# Patient Record
Sex: MALE | Race: WHITE | NOT HISPANIC OR LATINO | Employment: PART TIME | URBAN - METROPOLITAN AREA
[De-identification: names, ages, dates, MRNs, and addresses within clinical notes are randomized per-mention and may not be internally consistent; named-entity substitution may affect disease eponyms.]

---

## 2019-11-09 ENCOUNTER — HOSPITAL ENCOUNTER (EMERGENCY)
Facility: HOSPITAL | Age: 54
Discharge: HOME | End: 2019-11-09
Attending: EMERGENCY MEDICINE

## 2019-11-09 VITALS
OXYGEN SATURATION: 96 % | WEIGHT: 220 LBS | HEIGHT: 66 IN | BODY MASS INDEX: 35.36 KG/M2 | RESPIRATION RATE: 16 BRPM | TEMPERATURE: 98.1 F | DIASTOLIC BLOOD PRESSURE: 99 MMHG | SYSTOLIC BLOOD PRESSURE: 147 MMHG

## 2019-11-09 DIAGNOSIS — Z76.0 MEDICATION REFILL: Primary | ICD-10-CM

## 2019-11-09 PROCEDURE — 99281 EMR DPT VST MAYX REQ PHY/QHP: CPT

## 2019-11-09 RX ORDER — LISINOPRIL 10 MG/1
10 TABLET ORAL DAILY
COMMUNITY

## 2019-11-09 RX ORDER — GABAPENTIN 300 MG/1
300 CAPSULE ORAL
Refills: 1 | COMMUNITY
Start: 2019-10-26

## 2019-11-09 RX ORDER — LISINOPRIL 10 MG/1
10 TABLET ORAL DAILY
Qty: 30 TABLET | Refills: 0 | Status: SHIPPED | OUTPATIENT
Start: 2019-11-09 | End: 2019-12-09

## 2019-11-09 RX ORDER — AMLODIPINE BESYLATE 10 MG/1
10 TABLET ORAL DAILY
COMMUNITY

## 2019-11-09 RX ORDER — AMLODIPINE BESYLATE 10 MG/1
10 TABLET ORAL DAILY
Qty: 30 TABLET | Refills: 0 | Status: SHIPPED | OUTPATIENT
Start: 2019-11-09 | End: 2019-12-09

## 2019-11-09 ASSESSMENT — ENCOUNTER SYMPTOMS
SHORTNESS OF BREATH: 0
LIGHT-HEADEDNESS: 0
HEADACHES: 0
CHEST TIGHTNESS: 0
DIZZINESS: 0

## 2019-11-09 NOTE — ED PROVIDER NOTES
"HPI     Chief Complaint   Patient presents with   • Med Refill         History provided by:  Patient  Med Refill   Medications/supplies requested:  Amlodipine, lisinopril  Reason for request:  Medications ran out and clinic/provider not available  Medications taken before: yes - see home medications         Patient History     Past Medical History:   Diagnosis Date   • Chronic pain    • Hypertension        History reviewed. No pertinent surgical history.    History reviewed. No pertinent family history.    Social History     Tobacco Use   • Smoking status: Former Smoker   • Smokeless tobacco: Never Used   Substance Use Topics   • Alcohol use: Yes   • Drug use: Never       Systems Reviewed from Nursing Triage:          Review of Systems     Review of Systems   Respiratory: Negative for chest tightness and shortness of breath.    Cardiovascular: Negative for chest pain.   Neurological: Negative for dizziness, light-headedness and headaches.        Physical Exam     ED Triage Vitals [11/09/19 0823]   Temp Pulse Resp BP SpO2   36.7 °C (98.1 °F) -- 16 (!) 147/99 96 %      Temp Source Heart Rate Source Patient Position BP Location FiO2 (%) (Set)   Oral -- Lying Right upper arm --                     Patient Vitals for the past 24 hrs:   BP Temp Temp src Resp SpO2 Height Weight   11/09/19 0823 (!) 147/99 36.7 °C (98.1 °F) Oral 16 96 % 1.676 m (5' 6\") 99.8 kg (220 lb)                                       Physical Exam   Constitutional: He is oriented to person, place, and time. He appears well-developed and well-nourished.   Eyes: Pupils are equal, round, and reactive to light. Conjunctivae are normal.   Neck: Neck supple.   Cardiovascular: Normal rate, regular rhythm, normal heart sounds and intact distal pulses.   No murmur heard.  Pulmonary/Chest: Effort normal and breath sounds normal.   Neurological: He is alert and oriented to person, place, and time.   Skin: Skin is warm and dry.   Nursing note and vitals " reviewed.           Procedures    ED Course & MDM     Labs Reviewed - No data to display    No orders to display               MDM         Clinical Impressions as of Nov 09 0847   Medication refill        KitNilton garcia MD  11/09/19 0820

## 2023-02-01 RX ORDER — GABAPENTIN 100 MG/1
CAPSULE ORAL 3 TIMES DAILY
COMMUNITY

## 2023-02-01 RX ORDER — IBUPROFEN 600 MG/1
600 TABLET ORAL EVERY 6 HOURS PRN
COMMUNITY
Start: 2021-11-18